# Patient Record
Sex: FEMALE | Race: OTHER | HISPANIC OR LATINO | URBAN - METROPOLITAN AREA
[De-identification: names, ages, dates, MRNs, and addresses within clinical notes are randomized per-mention and may not be internally consistent; named-entity substitution may affect disease eponyms.]

---

## 2020-02-04 ENCOUNTER — EMERGENCY (EMERGENCY)
Facility: HOSPITAL | Age: 40
LOS: 1 days | Discharge: HOME | End: 2020-02-04
Attending: EMERGENCY MEDICINE
Payer: COMMERCIAL

## 2020-02-04 VITALS
TEMPERATURE: 98 F | WEIGHT: 175.05 LBS | HEART RATE: 84 BPM | RESPIRATION RATE: 18 BRPM | DIASTOLIC BLOOD PRESSURE: 70 MMHG | HEIGHT: 60 IN | OXYGEN SATURATION: 98 % | SYSTOLIC BLOOD PRESSURE: 102 MMHG

## 2020-02-04 PROCEDURE — 99283 EMERGENCY DEPT VISIT LOW MDM: CPT | Mod: 25

## 2020-02-04 PROCEDURE — 73610 X-RAY EXAM OF ANKLE: CPT | Mod: 26,LT

## 2020-02-04 PROCEDURE — 99284 EMERGENCY DEPT VISIT MOD MDM: CPT | Mod: 25

## 2020-02-04 PROCEDURE — 29515 APPLICATION SHORT LEG SPLINT: CPT

## 2020-02-04 PROCEDURE — 29515 APPLICATION SHORT LEG SPLINT: CPT | Mod: LT

## 2020-02-04 PROCEDURE — 73610 X-RAY EXAM OF ANKLE: CPT

## 2020-02-04 RX ORDER — IBUPROFEN 200 MG
600 TABLET ORAL ONCE
Refills: 0 | Status: COMPLETED | OUTPATIENT
Start: 2020-02-04 | End: 2020-02-04

## 2020-02-04 RX ORDER — OXYCODONE AND ACETAMINOPHEN 5; 325 MG/1; MG/1
1 TABLET ORAL ONCE
Refills: 0 | Status: DISCONTINUED | OUTPATIENT
Start: 2020-02-04 | End: 2020-02-04

## 2020-02-04 RX ORDER — IBUPROFEN 200 MG
1 TABLET ORAL
Qty: 20 | Refills: 0
Start: 2020-02-04 | End: 2020-02-08

## 2020-02-04 RX ADMIN — OXYCODONE AND ACETAMINOPHEN 1 TABLET(S): 5; 325 TABLET ORAL at 19:20

## 2020-02-04 RX ADMIN — Medication 600 MILLIGRAM(S): at 19:20

## 2020-02-04 RX ADMIN — Medication 600 MILLIGRAM(S): at 18:47

## 2020-02-04 RX ADMIN — OXYCODONE AND ACETAMINOPHEN 1 TABLET(S): 5; 325 TABLET ORAL at 18:47

## 2020-02-04 NOTE — ED PROVIDER NOTE - PROGRESS NOTE DETAILS
ID# [id number]: Xray shows non-displaced lateral malleolus fracture. Posterior splint applied. Will dc with crutches and strict non-weight bearing instructions. Will need to follow up with podiatry clinic in 5 days. Pt is well appearing, stable for discharge and follow up without fail with medical doctor. I had a detailed discussion with the patient and/or guardian regarding the historical points, exam findings, and any diagnostic results supporting the discharge diagnosis. Pt educated on care and need for follow up. Strict return instructions and red flag signs and symptoms discussed with patient. Questions answered. Pt shows understanding of discharge information and agrees to follow.

## 2020-02-04 NOTE — ED PROVIDER NOTE - NSFOLLOWUPINSTRUCTIONS_ED_ALL_ED_FT
Itzel un seguimiento con la clínica de podología en 5 días.    Si experimenta algún síntoma nuevo o que empeora o si le preocupa, siempre puede volver a la emergencia para myron reevaluación.    Si se le dieron recetas willow la visita, tómelas cirilo sam se las recetaron. Si tiene alguna pregunta, puede preguntarle al farmacéutico.

## 2020-02-04 NOTE — ED PROVIDER NOTE - PATIENT PORTAL LINK FT
You can access the FollowMyHealth Patient Portal offered by [facility] by registering at the following website: [email or web address]. By joining [employer]’s FollowMyHealth portal, you will also be able to view your health information using other applications (apps) compatible with our system.

## 2020-02-04 NOTE — ED PROVIDER NOTE - NSFOLLOWUPCLINICS_GEN_ALL_ED_FT
[facility]  Podiatry/Wound Care  [address] [place] [address]  Phone: [phone]  Fax: [phone]  Follow Up Time:

## 2020-02-04 NOTE — ED PROVIDER NOTE - PHYSICAL EXAMINATION
Musculoskeletal exam:  Left ankle limited ROM,   Moderate lateral malleolar swelling and tenderness  DP/PT pulses intact 2+ ,  Cap refill less than 2 sec,  No sensory deficits,  Left knee full range of motion with no swelling or tenderness   No tenderness along the tibia fibula other than lateral malleolar area,  No spinal paraspinal tenderness

## 2020-02-04 NOTE — ED PROVIDER NOTE - OBJECTIVE STATEMENT
38 y/o F patient with no significant PMHx and no significant PSHx presents for an evaluation s/p fall. Patient reports earlier today she was walking down the stairs and missed two steps twisting her ankle. Patient says she was able to catch herself from falling on the ground. Patient endorses severe ankle pain, swelling and inability to walk. Patient denies knee pain, neck pain, back pain, mild numbness to all the toes, and any other complaints. Patient denies any other injuries or complaints. NKDA.

## 2020-02-04 NOTE — ED ADULT NURSE NOTE - NSIMPLEMENTINTERV_GEN_ALL_ED
Implemented All Universal Safety Interventions:  [place] to call system. Call bell, personal items and telephone within reach. Instruct patient to call for assistance. Room bathroom lighting operational. Non-slip footwear when patient is off stretcher. Physically safe environment: no spills, clutter or unnecessary equipment. Stretcher in lowest position, wheels locked, appropriate side rails in place.
